# Patient Record
Sex: FEMALE | Race: WHITE | NOT HISPANIC OR LATINO | Employment: OTHER | URBAN - METROPOLITAN AREA
[De-identification: names, ages, dates, MRNs, and addresses within clinical notes are randomized per-mention and may not be internally consistent; named-entity substitution may affect disease eponyms.]

---

## 2019-04-24 ENCOUNTER — TELEPHONE (OUTPATIENT)
Dept: PHARMACY | Facility: CLINIC | Age: 59
End: 2019-04-24

## 2019-05-01 NOTE — TELEPHONE ENCOUNTER
Documentation Only:    Prior Authorization for Mavyret has been approved for 8 weeks of treatment    Case ID#: [id number]    Patient co-pay: $0    Patient Assistance IS NOT required.    Forwarding to clinical pharmacist for consult and shipment.    HERNAN 10:01am

## 2019-05-09 ENCOUNTER — TELEPHONE (OUTPATIENT)
Dept: PHARMACY | Facility: CLINIC | Age: 59
End: 2019-05-09

## 2019-05-09 NOTE — TELEPHONE ENCOUNTER
Initial Mavyret consult completed on . Mavyret will be shipped on  to arrive at patient's home on  via [employer]. Patient will start Mavyret on 5/15 with breakfast. Patient very excited and eager to be started on Mavyret.  Address confirmed - patient requested Mavyret be sent to her mother's home. Confirmed 2 patient identifiers - name and . Therapy Appropriate.     Mavyret 100/40mg- Take three tablets by mouth once daily WITH FOOD x 8 weeks  Counseling was reviewed:   1. Patient MUST take Mavret at the SAME time every day.   2. Potential Side effects include: nausea, headaches, diarrhea and fatigue.   Headache: Patient may treat with OTC remedies. If Tylenol is used, dose should not exceed 2000mg per day.    4. Medication list reviewed. No DDIs or allergies noted. Patient MUST contact myself or provider prior to starting any new OTC, herbal, or prescription drugs to avoid potential DDIs.    DDI: Medication list reviewed and potential DDIs addressed.  Medication Reconciliation preformed: patient currently taking Amlodipine, Potassium Chloride, Lasix, Adderell XR, Klonopin, Multivitamin.  No DDI identified, patient instructed to continue all medication as directed.      Discussed the importance of staying well hydrated while on therapy. Compliance stressed - patient to take missed doses as soon as remembered, but NOT to take 2 doses in one day. Patient will report questions or concerns to myself or practitioner. Patient verbalizes understanding. Patient plans to start Mavyret on 5/15.  Consultation included: indication; goals of treatment; administration; storage and handling; side effects; how to handle side effects; the importance of compliance; how to handle missed doses; the importance of laboratory monitoring; the importance of keeping all follow up appointments.  Patient understands to report any medication changes to [facility] and provider. All questions answered and addressed to patients satisfaction. F/U  will occur 7-10 days from start of treatment, [facility] to contact patient in 3 weeks for refills.

## 2019-06-04 ENCOUNTER — TELEPHONE (OUTPATIENT)
Dept: PHARMACY | Facility: CLINIC | Age: 59
End: 2019-06-04

## 2019-06-04 NOTE — TELEPHONE ENCOUNTER
Mavyret (2 of 2)  refill confirmed. We will ship Mavyret refill on 19 via [employer] to arrive on 19. $0.00 copay- 004. Confirmed 2 patient identifiers - name and .     Patient has 7 doses of Mavyret remaining and takes dose at same time daily.  Pt reports no issues with taking Mavyret and denies any side effects. No missed doses, no new medications, no new allergies or health conditions reported at this time. All questions answered and addressed to patients satisfaction. Pt verbalized understanding.

## 2019-06-19 PROBLEM — B18.2 CHRONIC HEPATITIS C WITHOUT HEPATIC COMA: Status: ACTIVE | Noted: 2019-06-19

## 2022-04-07 PROBLEM — M40.204 KYPHOSIS OF THORACIC REGION: Status: ACTIVE | Noted: 2022-04-07

## 2022-04-07 PROBLEM — J44.9 COPD (CHRONIC OBSTRUCTIVE PULMONARY DISEASE): Status: ACTIVE | Noted: 2022-04-07

## 2022-04-07 PROBLEM — J96.21 ACUTE ON CHRONIC RESPIRATORY FAILURE WITH HYPOXIA: Status: ACTIVE | Noted: 2022-04-07

## 2022-04-14 PROBLEM — I10 PRIMARY HYPERTENSION: Status: ACTIVE | Noted: 2022-04-14

## 2022-04-14 PROBLEM — J96.01 ACUTE RESPIRATORY FAILURE WITH HYPOXIA: Status: ACTIVE | Noted: 2022-04-07

## 2022-04-14 PROBLEM — F41.9 ANXIETY: Status: ACTIVE | Noted: 2022-04-14

## 2022-04-14 PROBLEM — D72.10 EOSINOPHILIA: Status: ACTIVE | Noted: 2022-04-14

## 2022-04-14 PROBLEM — J34.2 DEVIATED NASAL SEPTUM: Status: ACTIVE | Noted: 2022-04-14

## 2022-04-14 PROBLEM — B18.2 CHRONIC HEPATITIS C WITHOUT HEPATIC COMA: Status: ACTIVE | Noted: 2019-04-16

## 2022-04-14 PROBLEM — H65.30 CHRONIC MUCOID OTITIS MEDIA: Status: ACTIVE | Noted: 2022-04-14

## 2022-04-14 PROBLEM — J32.9 CHRONIC SINUSITIS: Status: ACTIVE | Noted: 2022-04-14

## 2022-04-14 PROBLEM — H69.90 EUSTACHIAN TUBE DYSFUNCTION: Status: ACTIVE | Noted: 2022-04-14

## 2022-04-14 PROBLEM — H90.6 MIXED CONDUCTIVE AND SENSORINEURAL HEARING LOSS, BILATERAL: Status: ACTIVE | Noted: 2022-04-14

## 2022-04-15 PROBLEM — J84.9 ILD (INTERSTITIAL LUNG DISEASE): Status: ACTIVE | Noted: 2022-04-15

## 2022-04-19 PROBLEM — J96.11 CHRONIC RESPIRATORY FAILURE WITH HYPOXIA: Status: ACTIVE | Noted: 2022-04-19

## 2022-04-19 PROBLEM — J96.01 ACUTE RESPIRATORY FAILURE WITH HYPOXIA: Status: RESOLVED | Noted: 2022-04-07 | Resolved: 2022-04-19

## 2022-04-19 PROBLEM — J84.116 CRYPTOGENIC ORGANIZING PNEUMONIA: Status: ACTIVE | Noted: 2022-04-19

## 2023-03-01 NOTE — TELEPHONE ENCOUNTER
Informed patient that [facility] received prescription for Mavyret and prior authorization is required.  [facility] will be back in touch once insurance determination is received.     [facility]  - [address]